# Patient Record
Sex: FEMALE | Race: WHITE | NOT HISPANIC OR LATINO | Employment: OTHER | URBAN - METROPOLITAN AREA
[De-identification: names, ages, dates, MRNs, and addresses within clinical notes are randomized per-mention and may not be internally consistent; named-entity substitution may affect disease eponyms.]

---

## 2017-02-08 ENCOUNTER — OFFICE VISIT (OUTPATIENT)
Dept: PULMONOLOGY | Facility: HOSPICE | Age: 62
End: 2017-02-08
Payer: COMMERCIAL

## 2017-02-08 VITALS
HEART RATE: 91 BPM | SYSTOLIC BLOOD PRESSURE: 124 MMHG | HEIGHT: 62 IN | OXYGEN SATURATION: 96 % | DIASTOLIC BLOOD PRESSURE: 82 MMHG | TEMPERATURE: 97.7 F | WEIGHT: 120.4 LBS | RESPIRATION RATE: 16 BRPM | BODY MASS INDEX: 22.16 KG/M2

## 2017-02-08 DIAGNOSIS — J30.1 ALLERGIC RHINITIS DUE TO POLLEN, UNSPECIFIED RHINITIS SEASONALITY: ICD-10-CM

## 2017-02-08 DIAGNOSIS — F51.04 PSYCHOPHYSIOLOGICAL INSOMNIA: ICD-10-CM

## 2017-02-08 DIAGNOSIS — J45.30 MILD PERSISTENT ASTHMA WITHOUT COMPLICATION: ICD-10-CM

## 2017-02-08 DIAGNOSIS — K21.9 GASTROESOPHAGEAL REFLUX DISEASE, ESOPHAGITIS PRESENCE NOT SPECIFIED: ICD-10-CM

## 2017-02-08 PROCEDURE — 99214 OFFICE O/P EST MOD 30 MIN: CPT | Performed by: INTERNAL MEDICINE

## 2017-02-08 NOTE — MR AVS SNAPSHOT
"        Rachel Kendrick   2017 11:20 AM   Office Visit   MRN: [id number]    Department:  [facility]   Dept Phone:  [phone]    Description:  Female : 1955   Provider:  Hawa Dennis M.D.           Reason for Visit     Follow-Up 6 month follow up      Allergies as of 2017     Allergen Noted Reactions    Flagyl [Kdc:Metronidazole+Tartrazine] 2016       Fosamax 2016       Sulfa Drugs 2016         You were diagnosed with     Mild persistent asthma without complication   [765492]       Psychophysiological insomnia   [796641]       Allergic rhinitis due to pollen, unspecified rhinitis seasonality   [3986615]       Gastroesophageal reflux disease, esophagitis presence not specified   [1722738]         Vital Signs     Blood Pressure Pulse Temperature Respirations Height Weight    124/82 mmHg 91 36.5 °C (97.7 °F) 16 1.575 m (5' 2.01\") 54.613 kg (120 lb 6.4 oz)    Body Mass Index Oxygen Saturation Smoking Status             22.02 kg/m2 96% Passive Smoke Exposure - Never Smoker         Basic Information     Date Of Birth Sex Race Ethnicity Preferred Language    1955 Female White Non- English      Your appointments     Aug 10, 2017 12:30 PM   Pulmonary Function Test with PFT-RM3   [facility] (--)    [address]  [place] [id number] [phone]            Aug 10, 2017  1:00 PM   Established Patient Pul with Hawa Dennis M.D.   [facility] (--)    [address] [facility] [address]  [place] [address] [phone]              Problem List              ICD-10-CM Priority Class Noted - Resolved    GERD (gastroesophageal reflux disease) K21.9   Unknown - Present    Depression F32.9   Unknown - Present    Insomnia G47.00   Unknown - Present    Hyperthyroidism E05.90   Unknown - Present    Allergic rhinitis J30.9   Unknown - Present    Mild intermittent asthma without complication J45.20   2016 - Present    Graves " disease E05.00   4/7/2016 - Present      Health Maintenance        Date Due Completion Dates    IMM DTaP/Tdap/Td Vaccine (1 - Tdap) 1/15/1973 ---    PAP SMEAR 1/15/1975 ---    MAMMOGRAM 1/15/1994 ---    COLONOSCOPY 1/15/2004 ---    IMM ZOSTER VACCINE 1/15/2014 ---    IMM PNEUMOCOCCAL 19-64 (ADULT) MEDIUM RISK SERIES (1 of 1 - PPSV23) 4/22/2016 2/26/2016    IMM INFLUENZA (1) 9/1/2016 ---            Current Immunizations     13-VALENT PCV PREVNAR 2/26/2016    Influenza TIV (IM) 9/1/2016      Below and/or attached are the medications your provider expects you to take. Review all of your home medications and newly ordered medications with your provider and/or pharmacist. Follow medication instructions as directed by your provider and/or pharmacist. Please keep your medication list with you and share with your provider. Update the information when medications are discontinued, doses are changed, or new medications (including over-the-counter products) are added; and carry medication information at all times in the event of emergency situations     Allergies:  FLAGYL - (reactions not documented)     FOSAMAX - (reactions not documented)     SULFA DRUGS - (reactions not documented)               Medications  Valid as of: February 08, 2017 - 12:02 PM    Generic Name Brand Name Tablet Size Instructions for use    Albuterol Sulfate (Aero Soln) albuterol 108 (90 BASE) MCG/ACT Inhale 2 Puffs by mouth every four hours as needed for Shortness of Breath.        Albuterol Sulfate (Nebu Soln) PROVENTIL 2.5mg/3ml 2.5 mg by Nebulization route every four hours as needed for Shortness of Breath.        Azelastine HCl   by Ophthalmic route.        ClonazePAM (TABLET DISPERSIBLE) Clonazepam 0.5 MG Take 1 Tab by mouth every day.        CycloSPORINE (Emulsion) RESTASIS 0.05 % Place 1 Drop in both eyes 2 times a day.        Eszopiclone (Tab) Eszopiclone 3 MG Take 3 mg by mouth at bedtime as needed.        Fluticasone-Salmeterol (Aerosol)  ADVAIR -21 MCG/ACT INHALE 2 PUFFS BY MOUTH TWICE DAILY WITH SPACER. RINSE MOUTH AFTER USE        L-Methylfolate-Algae   Take  by mouth.        Omeprazole (CAPSULE DELAYED RELEASE) PRILOSEC 20 MG Take 20 mg by mouth 2 times a day.        Oxybutynin Chloride (TABLET SR 24 HR) DITROPAN-XL 10 MG Take 10 mg by mouth every day.        PredniSONE (Tab) DELTASONE 10 MG 3 tabs by mouth for 5 days, 2 tabs daily for 5 days. 1 tab daily for 5 days.        TraZODone HCl (Tab) DESYREL 100 MG Take 100 mg by mouth every evening.        Triamcinolone Acetonide (Aerosol) NASACORT 55 MCG/ACT [place] 2 Sprays in nose every day.        ValACYclovir HCl (Tab) VALTREX 500 MG Take 500 mg by mouth 2 times a day.        Venlafaxine HCl (Tab) EFFEXOR 37.5 MG Take 37.5 mg by mouth every day.        .                 Medicines prescribed today were sent to:     [facility] [address] [place] [id number]  [address] [facility]  [place] [id number] [facility] [address]    Phone: [phone] Fax: [phone]    Open 24 Hours?: No      Medication refill instructions:       If your prescription bottle indicates you have medication refills left, it is not necessary to call your provider’s office. Please contact your pharmacy and they will refill your medication.    If your prescription bottle indicates you do not have any refills left, you may request refills at any time through one of the following ways: The online [employer] system (except Urgent Care), by calling your provider’s office, or by asking your pharmacy to contact your provider’s office with a refill request. Medication refills are processed only during regular business hours and may not be available until the next business day. Your provider may request additional information or to have a follow-up visit with you prior to refilling your medication.   *Please Note: Medication refills are assigned a new Rx number when refilled electronically. Your pharmacy  may indicate that no refills were authorized even though a new prescription for the same medication is available at the pharmacy. Please request the medicine by name with the pharmacy before contacting your provider for a refill.        Your To Do List     Future Labs/Procedures Complete By Expires    AMB SPIROMETRY  8/8/2017 2/8/2018         MyChart Status: Patient Declined

## 2017-02-10 NOTE — PROGRESS NOTES
Chief Complaint   Patient presents with   • Follow-Up     6 month follow up       HPI: This patient is a 62 y.o. Female who returns for follow-up of asthma. She is compliant with Advair  µg, has been using her MICHAEL routinely at bedtime rather than as needed. Denies asthma exacerbations since last visit. She denies worsening shortness of breath, cough, wheezing or chest tightness. Former spirometry showed FEV1 1.55 L or 67% consistent with moderately severe obstructive lung disease. She has allergic rhinitis, on nasal corticosteroids, which has also been stable.  She is up-to-date on influenza and pneumococcal vaccines.    Past Medical History   Diagnosis Date   • Allergic rhinitis    • Asthma    • Back pain    • Depression    • GERD (gastroesophageal reflux disease)    • Sleep apnea    • Neck pain    • IBS (irritable bowel syndrome)    • Osteopenia    • Mild intermittent asthma without complication 4/7/2016       Social History     Social History   • Marital Status: Single     Spouse Name: N/A   • Number of Children: N/A   • Years of Education: N/A     Occupational History   • Not on file.     Social History Main Topics   • Smoking status: Passive Smoke Exposure - Never Smoker   • Smokeless tobacco: Never Used   • Alcohol Use: No   • Drug Use: No   • Sexual Activity: Not on file     Other Topics Concern   • Not on file     Social History Narrative       History reviewed. No pertinent family history.    Current Outpatient Prescriptions on File Prior to Visit   Medication Sig Dispense Refill   • ADVAIR -21 MCG/ACT inhaler INHALE 2 PUFFS BY MOUTH TWICE DAILY WITH SPACER. RINSE MOUTH AFTER USE 1 Inhaler 5   • albuterol (PROVENTIL) 2.5mg/3ml Nebu Soln solution for nebulization 2.5 mg by Nebulization route every four hours as needed for Shortness of Breath.     • valacyclovir (VALTREX) 500 MG Tab Take 500 mg by mouth 2 times a day.     • Clonazepam 0.5 MG TABLET DISPERSIBLE Take 1 Tab by mouth every day.      • omeprazole (PRILOSEC) 20 MG delayed-release capsule Take 20 mg by mouth 2 times a day.     • albuterol (VENTOLIN OR PROVENTIL) 108 (90 BASE) MCG/ACT Aero Soln inhalation aerosol Inhale 2 Puffs by mouth every four hours as needed for Shortness of Breath. 2 Inhaler 5   • cyclosporin (RESTASIS) 0.05 % ophthalmic emulsion Place 1 Drop in both eyes 2 times a day.     • venlafaxine (EFFEXOR) 37.5 MG Tab Take 37.5 mg by mouth every day.     • oxybutynin SR (DITROPAN-XL) 10 MG CR tablet Take 10 mg by mouth every day.     • trazodone (DESYREL) 100 MG Tab Take 100 mg by mouth every evening.     • Eszopiclone 3 MG Tab Take 3 mg by mouth at bedtime as needed.     • triamcinolone (NASACORT) 55 MCG/ACT nasal inhaler Spray 2 Sprays in nose every day. (Patient not taking: Reported on 2/8/2017) 1 Bottle 5   • predniSONE (DELTASONE) 10 MG Tab 3 tabs by mouth for 5 days, 2 tabs daily for 5 days. 1 tab daily for 5 days. (Patient not taking: Reported on 7/26/2016) 30 Tab 0   • L-Methylfolate-Algae (DEPLIN 15 PO) Take  by mouth.     • AZELASTINE HCL OP by Ophthalmic route.       No current facility-administered medications on file prior to visit.       Allergies: Flagyl; Fosamax; and Sulfa drugs    ROS:   Constitutional: Denies fevers, chills, night sweats, fatigue or weight loss  Eyes: Denies vision loss, pain, drainage, double vision  Ears, Nose, Throat: Denies earache, difficulty hearing, tinnitus, nasal congestion, hoarseness  Cardiovascular: Denies chest pain, tightness, palpitations, orthopnea or edema  Respiratory: As in history of present illness  Sleep: Denies daytime sleepiness, snoring, apneas,+ insomnia, denies morning headaches  GI: Denies heartburn, dysphagia, nausea, abdominal pain, diarrhea or constipation  : Denies frequent urination, hematuria, discharge or painful urination  Musculoskeletal: Denies back pain, painful joints, sore muscles  Neurological: Denies weakness or headaches  Skin: No rashes    Blood  "pressure 124/82, pulse 91, temperature 36.5 °C (97.7 °F), resp. rate 16, height 1.575 m (5' 2.01\"), weight 54.613 kg (120 lb 6.4 oz), SpO2 96 %.  Multi-Ox Readings  Multi Ox #1     O2 sat % at rest     O2 sat % on exertion     O2 sat average on exertion     Multi Ox #2     O2 sat % at rest     O2 sat % on exertion     O2 sat average on exertion       Oxygen Use     Oxygen Frequency     Duration of need     Is the patient mobile within the home?     CPAP Use?     BIPAP Use?     Servo Titration         Physical Exam:  Appearance: Well-nourished, well-developed, in no acute distress  HEENT: Normocephalic, atraumatic, white sclera, PERRLA, oropharynx clear  Neck: No adenopathy or masses  Respiratory: no intercostal retractions or accessory muscle use  Lungs auscultation: Clear to auscultation bilaterally  Cardiovascular: Regular rate rhythm. No murmurs, rubs or gallops.  No LE edema  Abdomen: soft, nondistended  Gait: Normal  Digits: No clubbing, cyanosis  Motor: No focal deficits  Orientation: Oriented to time, person and place    Diagnosis:  1. Mild persistent asthma without complication  AMB SPIROMETRY   2. Psychophysiological insomnia     3. Allergic rhinitis due to pollen, unspecified rhinitis seasonality     4. Gastroesophageal reflux disease, esophagitis presence not specified         Plan:  The patient's asthma is clinically stable. Continue Advair  µg twice a day. Use albuterol HFA when necessary.  Continue nasal corticosteroids for allergic rhinitis.  Return in about 6 months (around 8/8/2017) for with spirometry.        "

## 2017-03-12 DIAGNOSIS — J45.20 MILD INTERMITTENT ASTHMA WITHOUT COMPLICATION: ICD-10-CM

## 2017-03-13 RX ORDER — FLUTICASONE PROPIONATE AND SALMETEROL XINAFOATE 115; 21 UG/1; UG/1
AEROSOL, METERED RESPIRATORY (INHALATION)
Qty: 1 INHALER | Refills: 5 | Status: SHIPPED | OUTPATIENT
Start: 2017-03-13

## 2017-03-13 NOTE — TELEPHONE ENCOUNTER
Have we ever prescribed this med? Yes.  If yes, what date? 09/06/16    Last OV: 02/8/17    Next OV: 08/10/17    DX: Asthma    Medications:   Requested Prescriptions     Pending Prescriptions Disp Refills   • ADVAIR -21 MCG/ACT inhaler [Pharmacy Med Name: ADVAIR /21MCG ORAL 'S] 12 g 0     Sig: TAKE 2 INHALATIONS BY MOUTH TWICE DAILY. RINSE MOUTH AFTER USE   ;

## 2018-11-14 ENCOUNTER — HOSPITAL ENCOUNTER (EMERGENCY)
Dept: HOSPITAL 8 - ED | Age: 63
Discharge: HOME | End: 2018-11-14
Payer: COMMERCIAL

## 2018-11-14 VITALS — DIASTOLIC BLOOD PRESSURE: 80 MMHG | SYSTOLIC BLOOD PRESSURE: 124 MMHG

## 2018-11-14 VITALS — HEIGHT: 62 IN | BODY MASS INDEX: 21.87 KG/M2 | WEIGHT: 118.83 LBS

## 2018-11-14 DIAGNOSIS — J45.909: ICD-10-CM

## 2018-11-14 DIAGNOSIS — R11.0: Primary | ICD-10-CM

## 2018-11-14 LAB
ALBUMIN SERPL-MCNC: 4 G/DL (ref 3.4–5)
ALP SERPL-CCNC: 57 U/L (ref 45–117)
ALT SERPL-CCNC: 36 U/L (ref 12–78)
ANION GAP SERPL CALC-SCNC: 13 MMOL/L (ref 5–15)
BASOPHILS # BLD AUTO: 0.04 X10^3/UL (ref 0–0.1)
BASOPHILS NFR BLD AUTO: 0 % (ref 0–1)
BILIRUB SERPL-MCNC: 1.1 MG/DL (ref 0.2–1)
CALCIUM SERPL-MCNC: 8.6 MG/DL (ref 8.5–10.1)
CHLORIDE SERPL-SCNC: 98 MMOL/L (ref 98–107)
CREAT SERPL-MCNC: 0.66 MG/DL (ref 0.55–1.02)
EOSINOPHIL # BLD AUTO: 0.05 X10^3/UL (ref 0–0.4)
EOSINOPHIL NFR BLD AUTO: 1 % (ref 1–7)
ERYTHROCYTE [DISTWIDTH] IN BLOOD BY AUTOMATED COUNT: 12.8 % (ref 9.6–15.2)
LYMPHOCYTES # BLD AUTO: 2.34 X10^3/UL (ref 1–3.4)
LYMPHOCYTES NFR BLD AUTO: 23 % (ref 22–44)
MCH RBC QN AUTO: 33.6 PG (ref 27–34.8)
MCHC RBC AUTO-ENTMCNC: 34.8 G/DL (ref 32.4–35.8)
MCV RBC AUTO: 96.7 FL (ref 80–100)
MD: NO
MONOCYTES # BLD AUTO: 0.81 X10^3/UL (ref 0.2–0.8)
MONOCYTES NFR BLD AUTO: 8 % (ref 2–9)
NEUTROPHILS # BLD AUTO: 6.88 X10^3/UL (ref 1.8–6.8)
NEUTROPHILS NFR BLD AUTO: 68 % (ref 42–75)
PLATELET # BLD AUTO: 315 X10^3/UL (ref 130–400)
PMV BLD AUTO: 6.9 FL (ref 7.4–10.4)
PROT SERPL-MCNC: 6.9 G/DL (ref 6.4–8.2)
RBC # BLD AUTO: 4.37 X10^6/UL (ref 3.82–5.3)
TROPONIN I SERPL-MCNC: < 0.015 NG/ML (ref 0–0.04)

## 2018-11-14 PROCEDURE — 80053 COMPREHEN METABOLIC PANEL: CPT

## 2018-11-14 PROCEDURE — 84484 ASSAY OF TROPONIN QUANT: CPT

## 2018-11-14 PROCEDURE — 83690 ASSAY OF LIPASE: CPT

## 2018-11-14 PROCEDURE — 96361 HYDRATE IV INFUSION ADD-ON: CPT

## 2018-11-14 PROCEDURE — 93005 ELECTROCARDIOGRAM TRACING: CPT

## 2018-11-14 PROCEDURE — 74177 CT ABD & PELVIS W/CONTRAST: CPT

## 2018-11-14 PROCEDURE — 96372 THER/PROPH/DIAG INJ SC/IM: CPT

## 2018-11-14 PROCEDURE — 99285 EMERGENCY DEPT VISIT HI MDM: CPT

## 2018-11-14 PROCEDURE — 96374 THER/PROPH/DIAG INJ IV PUSH: CPT

## 2018-11-14 PROCEDURE — 36415 COLL VENOUS BLD VENIPUNCTURE: CPT

## 2018-11-14 PROCEDURE — 85025 COMPLETE CBC W/AUTO DIFF WBC: CPT

## 2019-05-09 ENCOUNTER — HOSPITAL ENCOUNTER (OUTPATIENT)
Dept: HOSPITAL 8 - OUT | Age: 64
Discharge: HOME | End: 2019-05-09
Attending: SURGERY
Payer: MEDICARE

## 2019-05-09 VITALS — BODY MASS INDEX: 17.08 KG/M2 | WEIGHT: 92.81 LBS | HEIGHT: 62 IN

## 2019-05-09 VITALS — DIASTOLIC BLOOD PRESSURE: 71 MMHG | SYSTOLIC BLOOD PRESSURE: 116 MMHG

## 2019-05-09 DIAGNOSIS — J45.909: ICD-10-CM

## 2019-05-09 DIAGNOSIS — Z88.1: ICD-10-CM

## 2019-05-09 DIAGNOSIS — Z98.890: ICD-10-CM

## 2019-05-09 DIAGNOSIS — K82.4: Primary | ICD-10-CM

## 2019-05-09 DIAGNOSIS — K21.9: ICD-10-CM

## 2019-05-09 DIAGNOSIS — F41.9: ICD-10-CM

## 2019-05-09 DIAGNOSIS — Z79.82: ICD-10-CM

## 2019-05-09 DIAGNOSIS — Z88.8: ICD-10-CM

## 2019-05-09 PROCEDURE — 94640 AIRWAY INHALATION TREATMENT: CPT

## 2019-05-09 PROCEDURE — 88304 TISSUE EXAM BY PATHOLOGIST: CPT

## 2019-05-09 PROCEDURE — 47562 LAPAROSCOPIC CHOLECYSTECTOMY: CPT

## 2019-05-09 PROCEDURE — 93005 ELECTROCARDIOGRAM TRACING: CPT

## 2019-05-09 RX ADMIN — FENTANYL CITRATE PRN MCG: 50 INJECTION INTRAMUSCULAR; INTRAVENOUS at 09:43

## 2019-05-09 RX ADMIN — MEPERIDINE HYDROCHLORIDE PRN MG: 25 INJECTION, SOLUTION INTRAMUSCULAR; INTRAVENOUS; SUBCUTANEOUS at 09:28

## 2019-05-09 RX ADMIN — FENTANYL CITRATE PRN MCG: 50 INJECTION INTRAMUSCULAR; INTRAVENOUS at 10:09

## 2019-05-09 RX ADMIN — FENTANYL CITRATE PRN MCG: 50 INJECTION INTRAMUSCULAR; INTRAVENOUS at 09:57

## 2019-05-09 RX ADMIN — MEPERIDINE HYDROCHLORIDE PRN MG: 25 INJECTION, SOLUTION INTRAMUSCULAR; INTRAVENOUS; SUBCUTANEOUS at 09:50

## 2019-08-14 ENCOUNTER — HOSPITAL ENCOUNTER (EMERGENCY)
Dept: HOSPITAL 8 - ED | Age: 64
Discharge: HOME | End: 2019-08-14
Payer: MEDICARE

## 2019-08-14 VITALS — SYSTOLIC BLOOD PRESSURE: 128 MMHG | DIASTOLIC BLOOD PRESSURE: 75 MMHG

## 2019-08-14 VITALS — WEIGHT: 91.71 LBS | BODY MASS INDEX: 16.88 KG/M2 | HEIGHT: 62 IN

## 2019-08-14 DIAGNOSIS — Z90.49: ICD-10-CM

## 2019-08-14 DIAGNOSIS — R11.2: Primary | ICD-10-CM

## 2019-08-14 DIAGNOSIS — J45.909: ICD-10-CM

## 2019-08-14 DIAGNOSIS — R19.7: ICD-10-CM

## 2019-08-14 PROCEDURE — 96372 THER/PROPH/DIAG INJ SC/IM: CPT

## 2019-08-14 PROCEDURE — 83690 ASSAY OF LIPASE: CPT

## 2019-08-14 PROCEDURE — 96360 HYDRATION IV INFUSION INIT: CPT

## 2019-08-14 PROCEDURE — 93005 ELECTROCARDIOGRAM TRACING: CPT

## 2019-08-14 PROCEDURE — 81001 URINALYSIS AUTO W/SCOPE: CPT

## 2019-08-14 PROCEDURE — 85025 COMPLETE CBC W/AUTO DIFF WBC: CPT

## 2019-08-14 PROCEDURE — 36415 COLL VENOUS BLD VENIPUNCTURE: CPT

## 2019-08-14 PROCEDURE — 99284 EMERGENCY DEPT VISIT MOD MDM: CPT

## 2019-08-14 PROCEDURE — 80053 COMPREHEN METABOLIC PANEL: CPT

## 2019-11-25 ENCOUNTER — HOSPITAL ENCOUNTER (OUTPATIENT)
Dept: HOSPITAL 8 - RAD | Age: 64
Discharge: HOME | End: 2019-11-25
Attending: INTERNAL MEDICINE
Payer: MEDICARE

## 2019-11-25 DIAGNOSIS — R11.2: ICD-10-CM

## 2019-11-25 DIAGNOSIS — R63.4: ICD-10-CM

## 2019-11-25 DIAGNOSIS — K21.9: Primary | ICD-10-CM

## 2019-11-25 DIAGNOSIS — R14.0: ICD-10-CM

## 2019-11-25 DIAGNOSIS — Z88.8: ICD-10-CM

## 2019-11-25 DIAGNOSIS — Z88.2: ICD-10-CM

## 2019-11-25 DIAGNOSIS — R19.7: ICD-10-CM

## 2019-11-25 PROCEDURE — 76700 US EXAM ABDOM COMPLETE: CPT

## 2019-11-27 ENCOUNTER — HOSPITAL ENCOUNTER (OUTPATIENT)
Dept: HOSPITAL 8 - RAD | Age: 64
Discharge: HOME | End: 2019-11-27
Attending: INTERNAL MEDICINE
Payer: MEDICARE

## 2019-11-27 DIAGNOSIS — R10.9: ICD-10-CM

## 2019-11-27 DIAGNOSIS — K21.9: Primary | ICD-10-CM

## 2019-11-27 DIAGNOSIS — R11.10: ICD-10-CM

## 2019-11-27 DIAGNOSIS — R10.13: ICD-10-CM

## 2019-11-27 PROCEDURE — A9537 TC99M MEBROFENIN: HCPCS

## 2019-11-27 PROCEDURE — 78227 HEPATOBIL SYST IMAGE W/DRUG: CPT

## 2021-03-03 DIAGNOSIS — Z23 NEED FOR VACCINATION: ICD-10-CM
